# Patient Record
Sex: FEMALE | URBAN - METROPOLITAN AREA
[De-identification: names, ages, dates, MRNs, and addresses within clinical notes are randomized per-mention and may not be internally consistent; named-entity substitution may affect disease eponyms.]

---

## 2022-03-24 ENCOUNTER — OFFICE VISIT (OUTPATIENT)
Dept: URBAN - METROPOLITAN AREA CLINIC 6 | Facility: CLINIC | Age: 57
End: 2022-03-24
Payer: COMMERCIAL

## 2022-03-24 DIAGNOSIS — H25.013 CORTICAL AGE-RELATED CATARACT, BILATERAL: Primary | ICD-10-CM

## 2022-03-24 DIAGNOSIS — H04.123 TEAR FILM INSUFFICIENCY OF BILATERAL LACRIMAL GLANDS: ICD-10-CM

## 2022-03-24 PROCEDURE — 92136 OPHTHALMIC BIOMETRY: CPT | Performed by: OPHTHALMOLOGY

## 2022-03-24 PROCEDURE — 99204 OFFICE O/P NEW MOD 45 MIN: CPT | Performed by: OPHTHALMOLOGY

## 2022-03-24 ASSESSMENT — INTRAOCULAR PRESSURE
OS: 16
OD: 13

## 2022-03-24 NOTE — IMPRESSION/PLAN
Impression: Tear film insufficiency of bilateral lacrimal glands: H04.123. Plan: Mild dry eye both eyes - Recommend use of high quality artificial tears 3-4x per day prn.

## 2022-03-24 NOTE — IMPRESSION/PLAN
Impression: Cortical age-related cataract, bilateral: H25.013. Plan: Cataracts both eyes  - plan to perform cataract surgery both eyes with the left eye first: Discussed the risks, benefits, and alternatives of cataract surgery. Given that we almost never use retrobulbar anesthesia, there is typically no need to stop any anticoagulant medications when a patient gets cataract surgery with us. In most cataract surgeries performed by us we place an antibiotic and steroid at the time of surgery so most likely will not need to use any prescription post-op drops. The patient stated a full understanding and a desire to proceed with the procedure. Biometry ordered for intraocular lens selection. Advised against driving until complete. Reviewed the increased risk of retinal detachment after cataract surgery and reviewed retinal detachment and general warnings and to contact us immediately should any of those develop. The patient watched a video discussing Multifocal lenses, including their drawbacks - will require weak readers to see smallest print and will have glare and halos around point sources at night which will usually improve with time but may never completely go away due to the design of the lenses. Patient understands and accepts these shortcomings. If chooses monofocal implants, set OU at near -2.00. Astigmatism correction OK if indicated.

## 2022-04-15 ENCOUNTER — POST-OPERATIVE VISIT (OUTPATIENT)
Dept: URBAN - METROPOLITAN AREA CLINIC 6 | Facility: CLINIC | Age: 57
End: 2022-04-15
Payer: COMMERCIAL

## 2022-04-15 DIAGNOSIS — Z48.810 ENCOUNTER FOR SURGICAL AFTERCARE FOLLOWING SURGERY ON A SENSE ORGAN: Primary | ICD-10-CM

## 2022-04-15 PROCEDURE — 99024 POSTOP FOLLOW-UP VISIT: CPT | Performed by: OPTOMETRIST

## 2022-04-15 NOTE — IMPRESSION/PLAN
Impression: S/P Cataract Extraction/IOL OS - . Encounter for surgical aftercare following surgery on a sense organ  Z48.810. Plan: S/P Cataract extraction left eye with placement of intraocular Dexycu and moxifloxacin intracamerally - post-op day #0 -  Advised no heavy lifting or strenuous activity for at least one week and no swimming for at least three weeks. Use eye shield all day today and then at night for one week. Patient advised to call immediately for any problems including, but not limited to, pain, redness, increase in floaters, flashing lights, changes in peripheral vision, decreased vision, and/or any other problems or concerns. Patient stated an understanding of all the instructions. --Advised patient to use artificial tears for comfort.

## 2022-04-25 ENCOUNTER — POST-OPERATIVE VISIT (OUTPATIENT)
Dept: URBAN - METROPOLITAN AREA CLINIC 6 | Facility: CLINIC | Age: 57
End: 2022-04-25
Payer: COMMERCIAL

## 2022-04-25 DIAGNOSIS — Z48.810 ENCOUNTER FOR SURGICAL AFTERCARE FOLLOWING SURGERY ON A SENSE ORGAN: Primary | ICD-10-CM

## 2022-04-25 DIAGNOSIS — H25.811 COMBINED FORMS OF AGE-RELATED CATARACT, RIGHT EYE: ICD-10-CM

## 2022-04-25 PROCEDURE — 99024 POSTOP FOLLOW-UP VISIT: CPT | Performed by: OPTOMETRIST

## 2022-04-25 ASSESSMENT — INTRAOCULAR PRESSURE: OS: 15

## 2022-04-25 NOTE — IMPRESSION/PLAN
Impression: S/P Cataract Extraction by phacoemulsification with IOL placement OS - 10 Days. Encounter for surgical aftercare following surgery on a sense organ  Z48.810. Plan: s/p cataract surgery left eye with placement of intraocular Dexycu and moxifloxacin intracamerally. Healing well. Advised patient to avoid swimming for at least 2 more weeks. Advised to call immediately for any problems or concerns including, but not limited to, pain, redness, changes in peripheral vision and/or decreased vision. The patient stated an understanding of the above. --Advised patient to use artificial tears for comfort.

## 2022-05-27 ENCOUNTER — PROCEDURE (OUTPATIENT)
Dept: URBAN - METROPOLITAN AREA SURGERY 1 | Facility: SURGERY | Age: 57
End: 2022-05-27
Payer: COMMERCIAL

## 2022-05-27 ENCOUNTER — POST-OPERATIVE VISIT (OUTPATIENT)
Dept: URBAN - METROPOLITAN AREA CLINIC 6 | Facility: CLINIC | Age: 57
End: 2022-05-27
Payer: COMMERCIAL

## 2022-05-27 ENCOUNTER — Encounter (OUTPATIENT)
Dept: URBAN - METROPOLITAN AREA SURGERY 2 | Facility: SURGERY | Age: 57
End: 2022-05-27
Payer: COMMERCIAL

## 2022-05-27 DIAGNOSIS — Z96.1 PRESENCE OF INTRAOCULAR LENS: Primary | ICD-10-CM

## 2022-05-27 PROCEDURE — 66984 XCAPSL CTRC RMVL W/O ECP: CPT | Performed by: OPHTHALMOLOGY

## 2022-05-27 PROCEDURE — 99024 POSTOP FOLLOW-UP VISIT: CPT | Performed by: OPTOMETRIST

## 2022-05-27 ASSESSMENT — INTRAOCULAR PRESSURE
OD: 15
OS: 15
OD: 15
OS: 15
OS: 15
OD: 15

## 2022-05-27 NOTE — IMPRESSION/PLAN
Impression: S/P Cataract Extraction by phacoemulsification with IOL placement OD - . Presence of intraocular lens  Z96.1. Plan: S/P Cataract extraction right eye with placement of intraocular Dexycu and moxifloxacin intracamerally - post-op day #0 -  Advised no heavy lifting or strenuous activity for at least one week and no swimming for at least three weeks. Use eye shield all day today and then at night for one week. Patient advised to call immediately for any problems including, but not limited to, pain, redness, increase in floaters, flashing lights, changes in peripheral vision, decreased vision, and/or any other problems or concerns. Patient stated an understanding of all the instructions. --Advised patient to use artificial tears for comfort.

## 2022-06-10 ENCOUNTER — POST-OPERATIVE VISIT (OUTPATIENT)
Dept: URBAN - METROPOLITAN AREA CLINIC 5 | Facility: CLINIC | Age: 57
End: 2022-06-10
Payer: COMMERCIAL

## 2022-06-10 DIAGNOSIS — Z96.1 PRESENCE OF INTRAOCULAR LENS: Primary | ICD-10-CM

## 2022-06-10 PROCEDURE — 99024 POSTOP FOLLOW-UP VISIT: CPT | Performed by: OPTOMETRIST

## 2022-06-10 ASSESSMENT — INTRAOCULAR PRESSURE
OD: 17
OS: 14

## 2022-06-10 NOTE — IMPRESSION/PLAN
Impression: S/P Cataract Extraction by phacoemulsification with IOL placement OD - 14 Days. Presence of intraocular lens  Z96.1. Plan: s/p cataract surgery right eye with placement of intraocular Dexycu or subconjunctival Kenalog and moxifloxacin intracamerally. Healing well. Advised patient to avoid swimming for at least 2 more weeks. Advised to call immediately for any problems or concerns including, but not limited to, pain, redness, changes in peripheral vision and/or decreased vision. The patient stated an understanding of the above. RTC in 3 months for dilated examination. May refract sooner PRN.

## 2022-07-01 ENCOUNTER — POST-OPERATIVE VISIT (OUTPATIENT)
Dept: URBAN - METROPOLITAN AREA CLINIC 5 | Facility: CLINIC | Age: 57
End: 2022-07-01

## 2022-07-01 DIAGNOSIS — Z96.1 PRESENCE OF INTRAOCULAR LENS: ICD-10-CM

## 2022-07-01 DIAGNOSIS — H52.13 MYOPIA, BILATERAL: Primary | ICD-10-CM

## 2022-07-01 PROCEDURE — 99024 POSTOP FOLLOW-UP VISIT: CPT | Performed by: OPTOMETRIST

## 2022-07-01 ASSESSMENT — INTRAOCULAR PRESSURE
OS: 14
OD: 12

## 2022-07-01 ASSESSMENT — VISUAL ACUITY
OS: 20/20
OD: 20/20